# Patient Record
Sex: FEMALE | Race: WHITE
[De-identification: names, ages, dates, MRNs, and addresses within clinical notes are randomized per-mention and may not be internally consistent; named-entity substitution may affect disease eponyms.]

---

## 2019-10-08 ENCOUNTER — HOSPITAL ENCOUNTER (EMERGENCY)
Dept: HOSPITAL 25 - UCCORT | Age: 54
Discharge: HOME HEALTH | End: 2019-10-08
Payer: COMMERCIAL

## 2019-10-08 VITALS — DIASTOLIC BLOOD PRESSURE: 67 MMHG | SYSTOLIC BLOOD PRESSURE: 117 MMHG

## 2019-10-08 DIAGNOSIS — Z88.8: ICD-10-CM

## 2019-10-08 DIAGNOSIS — F17.210: ICD-10-CM

## 2019-10-08 DIAGNOSIS — R19.7: ICD-10-CM

## 2019-10-08 DIAGNOSIS — M54.5: ICD-10-CM

## 2019-10-08 DIAGNOSIS — R10.9: Primary | ICD-10-CM

## 2019-10-08 PROCEDURE — 81003 URINALYSIS AUTO W/O SCOPE: CPT

## 2019-10-08 PROCEDURE — 99212 OFFICE O/P EST SF 10 MIN: CPT

## 2019-10-08 PROCEDURE — G0463 HOSPITAL OUTPT CLINIC VISIT: HCPCS

## 2019-10-08 NOTE — UC
Back Pain HPI





- HPI Summary


HPI Summary: 





53 yo female presents with left flank and left abdominal pain. She tells me 

that for the last month or so she has noticed left side pain once or twice 

throughout the day that is worse after she eats. She will have sudden cramping 

and then have a BM that is "watery" and her cramping resolves. Today she has 

had constant left flank pain that is radiating to her left side that is sharp 

and hurts to touch. She has not had any BM today. She last had a colonoscopy 1 

year ago and was normal per pt. Denies fever, chills, n/v, sob, injury to the 

area, dysuria. 





- History of Current Complaint


Stated Complaint: LEFT SIDE LOWER BACK PAIN


Time Seen by Provider: 10/08/19 15:16


Hx Obtained From: Patient


Onset/Duration: Gradual Onset


Severity Initially: Moderate


Severity Currently: Severe


Pain Intensity: 8


Pain Scale Used: 0-10 Numeric





- Allergies/Home Medications


Allergies/Adverse Reactions: 


 Allergies











Allergy/AdvReac Type Severity Reaction Status Date / Time


 


aspirin Allergy  Anaphylatic Verified 10/08/19 15:18





   Shock  











Home Medications: 


 Home Medications





NK [No Home Medications Reported]  10/08/19 [History Confirmed 10/08/19]











PMH/Surg Hx/FS Hx/Imm Hx





- Additional Past Medical History


Additional PMH: 





None





- Surgical History


Surgical History: Yes


Surgery Procedure, Year, and Place: ORIF right knee





- Family History


Known Family History: Positive: Non-Contributory





- Social History


Occupation: Employed Full-time


Lives: With Family


Alcohol Use: Occasionally


Substance Use Type: None


Smoking Status (MU): Current Every Day Smoker


Type: Cigarettes





Review of Systems


All Other Systems Reviewed And Are Negative: No


Constitutional: Positive: Negative


Skin: Positive: Negative


Respiratory: Positive: Negative


Cardiovascular: Positive: Negative


Gastrointestinal: Positive: Abdominal Pain, Diarrhea


Genitourinary: Positive: Negative


Neurovascular: Positive: Negative


Neurological: Positive: Negative


Psychological: Positive: Negative





Physical Exam





- Summary


Physical Exam Summary: 





GENERAL: NAD. WDWN. No pain distress.


SKIN: No rashes, sores, lesions, or open wounds.


NECK: Supple. Nontender. No lymphadenopathy. 


CHEST:  CTAB. No r/r/w. No accessory muscle use. Breathing comfortably and in 

no distress.


CV:  RRR. Pulses intact. Cap refill <2seconds


ABDOMEN:  LEFT FLANK: Moderate TTP. Left side and LUQ with mild TTP. Soft. No 

distention or guarding. Bowel sounds present


NEURO: Alert. 


PSYCH: Age appropriate behavior.


Triage Information Reviewed: Yes


Vital Signs: 





Vital Signs:











Temp Pulse Resp BP Pulse Ox


 


 97.3 F   64   18   117/67   99 


 


 10/08/19 15:11  10/08/19 15:11  10/08/19 15:11  10/08/19 15:11  10/08/19 15:11








 Laboratory Tests











  10/08/19





  15:29


 


POC Urine Color  Yellow


 


POC Urine Clarity  Clear


 


POC Urine pH  5.5


 


POC Ur Specif Gravity  >= 1.030


 


POC Urine Protein  2+ A


 


POC Ur Glucose (UA)  Negative


 


POC Urine Ketones  Negative


 


POC Urine Blood  Trace-lysed A


 


POC Urine Nitrite  Negative


 


POC Urine Bilirubin  Negative


 


POC Urine Urobilinogen  0.2


 


POC U Leukocyte Esteras  Negative











Vital Signs Reviewed: Yes





Back Pain Course/Dx





- Course


Course Of Treatment: 





UA negative.


Given pt's worsening pain that is now constant - I recommended that she undergo 

further evaluation in the ED.





- Differential Dx/Diagnosis


Provider Diagnosis: 


 Left flank pain








Discharge ED





- Sign-Out/Discharge


Documenting (check all that apply): Patient Departure


All imaging exams completed and their final reports reviewed: No Studies





- Discharge Plan


Condition: Stable


Disposition: HOME-RECOMMEND TO ED


Referrals: 


Lizzie Lema MD [Primary Care Provider] - 


Additional Instructions: 


I recommend that you go to the ER for further evaluation of your left side 

pain. 





Your urine test was normal in the clinic today.





- Billing Disposition and Condition


Condition: STABLE


Disposition: Home-Recommend to ED